# Patient Record
Sex: MALE | Race: WHITE | NOT HISPANIC OR LATINO | URBAN - NONMETROPOLITAN AREA
[De-identification: names, ages, dates, MRNs, and addresses within clinical notes are randomized per-mention and may not be internally consistent; named-entity substitution may affect disease eponyms.]

---

## 2020-06-21 ENCOUNTER — OFFICE VISIT (OUTPATIENT)
Dept: URGENT CARE | Facility: CLINIC | Age: 50
End: 2020-06-21
Payer: COMMERCIAL

## 2020-06-21 VITALS
SYSTOLIC BLOOD PRESSURE: 120 MMHG | HEIGHT: 68 IN | OXYGEN SATURATION: 95 % | TEMPERATURE: 98.3 F | HEART RATE: 75 BPM | DIASTOLIC BLOOD PRESSURE: 82 MMHG | WEIGHT: 196.4 LBS | BODY MASS INDEX: 29.77 KG/M2

## 2020-06-21 DIAGNOSIS — M25.511 ACUTE PAIN OF RIGHT SHOULDER: ICD-10-CM

## 2020-06-21 PROCEDURE — 99203 OFFICE O/P NEW LOW 30 MIN: CPT | Performed by: PHYSICIAN ASSISTANT

## 2020-06-21 RX ORDER — CYCLOBENZAPRINE HCL 5 MG
5-10 TABLET ORAL
Qty: 15 TAB | Refills: 0 | Status: SHIPPED | OUTPATIENT
Start: 2020-06-21

## 2020-06-21 RX ORDER — KETOROLAC TROMETHAMINE 30 MG/ML
60 INJECTION, SOLUTION INTRAMUSCULAR; INTRAVENOUS ONCE
Status: COMPLETED | OUTPATIENT
Start: 2020-06-21 | End: 2020-06-21

## 2020-06-21 RX ADMIN — KETOROLAC TROMETHAMINE 60 MG: 30 INJECTION, SOLUTION INTRAMUSCULAR; INTRAVENOUS at 09:46

## 2020-06-21 ASSESSMENT — ENCOUNTER SYMPTOMS
NECK PAIN: 0
CHILLS: 0
FALLS: 0
TINGLING: 1
WEAKNESS: 0
BACK PAIN: 0
FEVER: 0
SENSORY CHANGE: 0
BRUISES/BLEEDS EASILY: 0

## 2020-06-21 NOTE — PROGRESS NOTES
"Subjective:      Javier Heller is a 50 y.o. male who presents with Shoulder Pain ((R) shoulder x 3 days; radiates down to tricep; fingers tinger )            HPI  50-year-old male presents to urgent care with new problem of right shoulder pain onset 3 days ago.  Patient cannot recall specific injury, but reports pain started 3 days ago. He notes recent travel last week and recalls picking up luggage weighting about 50+ pounds, but did not notice immediate pain.   Reports mild tingling in hand, but no numbness or weakness.   Pain is constant.   Patient is left hand dominant.   Denies previous injury.   Tylenol with good relief.   Denies other associated aggravating or alleviating factors.    Review of Systems   Constitutional: Negative for chills and fever.   Musculoskeletal: Negative for back pain, falls and neck pain.        Right shoulder pain   Neurological: Positive for tingling. Negative for sensory change and weakness.   Endo/Heme/Allergies: Does not bruise/bleed easily.   All other systems reviewed and are negative.      History reviewed. No pertinent past medical history.  Medications and allergies reviewed in [facility].  Social History     Tobacco Use   • Smoking status: Never Smoker   • Smokeless tobacco: Never Used   Substance Use Topics   • Alcohol use: Yes     Comment: occ       Objective:     /82 (BP Location: Left arm, Patient Position: Sitting)   Pulse 75   Temp 36.8 °C (98.3 °F) (Temporal)   Ht 1.727 m (5' 8\")   Wt 89.1 kg (196 lb 6.4 oz)   SpO2 95%   BMI 29.86 kg/m²      Physical Exam  Vitals signs reviewed.   Constitutional:       General: He is not in acute distress.     Appearance: Normal appearance. He is well-developed.   HENT:      Head: Normocephalic and atraumatic.   Eyes:      Conjunctiva/sclera: Conjunctivae normal.   Neck:      Musculoskeletal: Normal range of motion and neck supple.   Cardiovascular:      Rate and Rhythm: Normal rate.   Pulmonary:      Effort: Pulmonary " effort is normal. No respiratory distress.   Musculoskeletal:        Arms:    Skin:     General: Skin is warm and dry.      Capillary Refill: Capillary refill takes less than 2 seconds.      Findings: No bruising.   Neurological:      General: No focal deficit present.      Mental Status: He is alert and oriented to person, place, and time.   Psychiatric:         Mood and Affect: Mood normal.         Behavior: Behavior normal.         Thought Content: Thought content normal.         Judgment: Judgment normal.                 Assessment/Plan:     1. Acute pain of right shoulder  cyclobenzaprine (FLEXERIL) 5 MG tablet    ketorolac (TORADOL) injection 60 mg    REFERRAL TO SPORTS MEDICINE     Patient given 60 mg IM injection of Toradol in clinic with good relief.  Patient advised to take Flexeril prior to bed and do not take medication while at work or operating a vehicle.  He may continue with OTC Tylenol.  Laying given for comfort.  Recommend gentle range of motion exercises.  Follow-up with sports medicine for persistent pain.  No indications for imaging at this time.   Patient verbalized understanding of treatment plan and has no further questions regarding care.

## 2020-06-26 ENCOUNTER — OFFICE VISIT (OUTPATIENT)
Dept: MEDICAL GROUP | Facility: CLINIC | Age: 50
End: 2020-06-26
Payer: COMMERCIAL

## 2020-06-26 VITALS
DIASTOLIC BLOOD PRESSURE: 82 MMHG | WEIGHT: 196.4 LBS | OXYGEN SATURATION: 95 % | TEMPERATURE: 97.7 F | HEART RATE: 100 BPM | RESPIRATION RATE: 18 BRPM | BODY MASS INDEX: 29.77 KG/M2 | SYSTOLIC BLOOD PRESSURE: 122 MMHG | HEIGHT: 68 IN

## 2020-06-26 DIAGNOSIS — M54.12 RIGHT CERVICAL RADICULOPATHY: ICD-10-CM

## 2020-06-26 PROCEDURE — 99214 OFFICE O/P EST MOD 30 MIN: CPT | Performed by: FAMILY MEDICINE

## 2020-06-26 NOTE — PROGRESS NOTES
CHIEF COMPLAINT:  Javier Heller male presenting at the request of Elenita Mejia P.A.-C.  for evaluation of Shoulder pain.   Javier Heller is complaining of right shoulder pain (left handed)  Insidious onset without specific injury approximately a week ago (late June 19, 2020)  Pain is at the deltoid region and susana-scapular   Has noticed a small lump on the RIGHT shoulder at the supraspinatus region  Quality is aching it can be sharp with certain movements  Pain is Non-radiating  Aggravated by movement, overhead activity and reaching back   NO improvement with sling immobilization provided in urgent care  Improved with  rest, but having pain at night  no prior problems with this shoulder in the past  Prior Treatments: Seen at urgent care  Prior studies: NO Prior imaging has been done   Medications tried for pain include: acetaminophen, naproxen (OTC), cyclobenzaprine at bedtime  POSITIVE night symptoms   Mechanical Symptom history: No Locking and Popping which is not necessarily any more painful than baseline  Denies any history of cervical spine pain, he did experience some mild ulnar-sided hand tingling which was transient without any notable weakness    Desk work, 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 No pertinent past medical history.    PMH:  has no past medical history on file.  MEDS:   Current Outpatient Medications:   •  cyclobenzaprine (FLEXERIL) 5 MG tablet, Take 1-2 Tabs by mouth at bedtime as needed for Moderate Pain or Muscle Spasms., Disp: 15 Tab, Rfl: 0  ALLERGIES: No Known Allergies  SURGHX: No past surgical history on file.  SOCHX:  reports that he has never smoked. He has never used smokeless tobacco. He reports current alcohol  use.  FH: Family history was reviewed, no pertinent findings to report     PHYSICAL EXAM:  There were no vitals taken for this visit.     well-developed, well-nourished in no apparent distress, alert and oriented x 3.  Gait: normal    Cervical spine:  Range of motion Slightly limited with Extension and Slightly limited with Lateral rotation  Spurling's testing is POSITIVE with pain radiating into the shoulder and susana-scapular region on the RIGHT  Cervical spine tenderness POSITIVE at the level of C5    Strength testing:     Deltoid, bilateral 5/5  Bicep, bilateral 5/5  Tricep, bilateral 5/5  Wrist Extension, bilateral 5/5  Wrist Flexion, bilateral 5/5  Finger Abduction, bilateral 5/5    Sensation:  INTACT Bilaterally        Reflexes:   Biceps: R 2+/L 2+  Triceps: R 2+/L  2+  Brachial radialis R 2+/L  2+  Oates's testing is NEGATIVE  The arms are otherwise neurovascularly intact     Shoulder Exam:    RIGHT Shoulder:  No visible swelling   Range of motion INTACT  Tenderness: Non-tender  Empty Can Testing 5/5  Internal Rotation 5/5  External Rotation 5/5  Lift Off Testing 5/5  Impingement testing Vazquez  NEGATIVE  Neer's testing NEGATIVE  Apprehension testing NEGATIVE    LEFT Shoulder:  No visible swelling   Range of motion INTACT  Tenderness: Non-tender  Empty Can Testing 5/5  Internal Rotation 5/5  External Rotation 5/5  Lift Off Testing 5/5  Impingement testing Vazquez  NEGATIVE  Neer's testing NEGATIVE  Apprehension testing NEGATIVE    Additional Findings: None    1. Right cervical radiculopathy       Javier Heller is complaining of right shoulder pain (left handed)  Insidious onset without specific injury approximately a week ago (late June 19, 2020)  No specific injury, but he had been traveling and fell asleep on the way back from [place] with his neck in an awkward position  Pain began 1 to 2 days after that  He also mentions lifting luggage during that trip, but does not recall any specific  injury    On examination he is POSITIVE Spurling's test to the RIGHT side  Otherwise, normal examination  Demonstrated strain/counterstrain technique along with cervical stretches  Provided with cervical spine program    Since his symptoms just began recently, we will not obtain x-rays unless symptoms persist    Patient has also been advised that he can contact us so we can order formal physical therapy down near his home in [place] if his symptoms persist    Follow-up as needed    X-ray imaging  not obtained    Thank you Elenita Mejia P.A.-C. for allowing me to aid in caring for your patient.

## 2020-07-03 ENCOUNTER — TELEPHONE (OUTPATIENT)
Dept: URGENT CARE | Facility: CLINIC | Age: 50
End: 2020-07-03

## 2020-07-03 NOTE — TELEPHONE ENCOUNTER
The patient called Sports Medicine 7/2 @ 1441, left a message to make an appointment with Dr. Mccain, pain is worsening. I called th patient 7/3 @ 0810am to follow up on call,b ut left a voicemail. I will try the patient again.    Yaz

## 2020-07-05 ENCOUNTER — TELEPHONE (OUTPATIENT)
Dept: URGENT CARE | Facility: CLINIC | Age: 50
End: 2020-07-05

## 2020-07-05 NOTE — TELEPHONE ENCOUNTER
The patient called Sports Medicine 73 @ 1225pm re: an appointment for Sports Medicine to bridge the patient while Dr. Mccain is out of the office. I called the patient 7/5 @ 1100am, left a message, asking the patient to call [phone] to make an appointment.

## 2020-07-10 ENCOUNTER — OFFICE VISIT (OUTPATIENT)
Dept: MEDICAL GROUP | Facility: CLINIC | Age: 50
End: 2020-07-10
Payer: COMMERCIAL

## 2020-07-10 VITALS
TEMPERATURE: 98.4 F | HEART RATE: 80 BPM | SYSTOLIC BLOOD PRESSURE: 120 MMHG | OXYGEN SATURATION: 98 % | HEIGHT: 68 IN | DIASTOLIC BLOOD PRESSURE: 80 MMHG | RESPIRATION RATE: 14 BRPM | BODY MASS INDEX: 29.77 KG/M2 | WEIGHT: 196.43 LBS

## 2020-07-10 DIAGNOSIS — M54.12 CERVICAL RADICULOPATHY: ICD-10-CM

## 2020-07-10 PROCEDURE — 99213 OFFICE O/P EST LOW 20 MIN: CPT | Performed by: FAMILY MEDICINE

## 2020-07-10 RX ORDER — PREDNISONE 20 MG/1
TABLET ORAL
Qty: 11 TAB | Refills: 0 | Status: SHIPPED | OUTPATIENT
Start: 2020-07-10

## 2020-07-10 NOTE — PROGRESS NOTES
"Subjective:     Javier Heller is a 50 y.o. male who presents for Neck Pain (The patient is seeing Dr. Mccain for neck pain that is constant and or may have a pinched nerve. ) and Shoulder Pain (Right shoulder pain.)    HPI  Pt presents for re-evaluation of neck/shoulder pain   Neck pain radiates into right upper extremity  Continues to have full function of right upper extremity, no focal weakness  No numbness or tingling  Pt initially seen by Dr. Mccain who thought majority of pain was from cervical radiculopathy   Pt was given some exercises to do at home   He has not been doing these exercises as they make the neck painful   Does not feel he is making any improvements since last visit with Dr. Mccain    Review of Systems   Constitutional: Negative for fever.   Respiratory: Negative for shortness of breath.    Cardiovascular: Negative for chest pain.   Gastrointestinal: Negative for vomiting.   Skin: Negative for rash.   Neurological: Negative for focal weakness.       PMH: No chronic medical problems   MEDS:   Current Outpatient Medications:   •  cyclobenzaprine (FLEXERIL) 5 MG tablet, Take 1-2 Tabs by mouth at bedtime as needed for Moderate Pain or Muscle Spasms., Disp: 15 Tab, Rfl: 0  ALLERGIES: No Known Allergies  SURGHX: No past surgical history on file.  SOCHX:  reports that he has never smoked. He has never used smokeless tobacco. He reports current alcohol use.  FH: Family history was reviewed, not contributing to acute neck pain     Objective:   /80 (BP Location: Left arm, Patient Position: Sitting, BP Cuff Size: Adult)   Pulse 80   Temp 36.9 °C (98.4 °F) (Temporal)   Resp 14   Ht 1.727 m (5' 8\")   Wt 89.1 kg (196 lb 6.9 oz)   SpO2 98%   BMI 29.87 kg/m²     Physical Exam  Constitutional:       General: He is not in acute distress.     Appearance: He is well-developed. He is not diaphoretic.   HENT:      Head: Normocephalic and atraumatic.   Musculoskeletal:      Comments: " Neck  General: No asymmetry, bruising, or erythema appreciated  ROM: FROM flex/extend/lateral bend/lateral rotation  Palpation: +Mild TTP along right cervical muscles and right superior trapezius  Special tests: +Spurling's on right   Neuro: Sensation intact and equal bilaterally in UE's  Vascular: Radial pulse 2+    Skin:     General: Skin is warm and dry.      Findings: No rash.   Neurological:      Mental Status: He is alert and oriented to person, place, and time.   Psychiatric:         Behavior: Behavior normal.         Thought Content: Thought content normal.         Judgment: Judgment normal.         Assessment/Plan:   Assessment    1. Cervical radiculopathy  - predniSONE (DELTASONE) 20 MG Tab; Take 2 tabs (40mg) daily x 3 days, then take 1 tab (20mg) daily x 3 days, then take 1/2 tab (10mg) daily x 4 days  Dispense: 11 Tab; Refill: 0  - REFERRAL TO PHYSICAL THERAPY Reason for Therapy: Eval/Treat/Report    Patient with cervical radiculopathy.  He is not tolerating home exercise program very well and not making any improvements.  Patient is anxious to get feeling better quickly.  Will treat with short course of prednisone and refer to physical therapy.  F/U with Dr. Mccain

## 2020-07-14 ENCOUNTER — TELEPHONE (OUTPATIENT)
Dept: MEDICAL GROUP | Facility: CLINIC | Age: 50
End: 2020-07-14

## 2020-07-14 ASSESSMENT — ENCOUNTER SYMPTOMS
VOMITING: 0
FEVER: 0
FOCAL WEAKNESS: 0
SHORTNESS OF BREATH: 0

## 2020-07-14 NOTE — TELEPHONE ENCOUNTER
Patient called today to check on the status of his referral for physical therapy. According to referrals they can not finish processing it until you finish the clinical notes. Can you please finish these and have Yaz call referrals department as soon as this is done. Then please let the patient know. His best contact number is listed in his chart.     Please and Thank you.  Regine

## 2020-07-15 NOTE — TELEPHONE ENCOUNTER
I spoke with the patient, relayed information for PT and the patient will call PT to schedule.  Yaz Cross

## 2024-07-12 ENCOUNTER — OFFICE VISIT (OUTPATIENT)
Dept: URGENT CARE | Facility: CLINIC | Age: 54
End: 2024-07-12

## 2024-07-12 VITALS
OXYGEN SATURATION: 95 % | BODY MASS INDEX: 30.92 KG/M2 | HEIGHT: 68 IN | SYSTOLIC BLOOD PRESSURE: 140 MMHG | RESPIRATION RATE: 18 BRPM | HEART RATE: 116 BPM | TEMPERATURE: 98.6 F | DIASTOLIC BLOOD PRESSURE: 90 MMHG | WEIGHT: 204 LBS

## 2024-07-12 DIAGNOSIS — K57.92 DIVERTICULITIS: ICD-10-CM

## 2024-07-12 PROCEDURE — 99214 OFFICE O/P EST MOD 30 MIN: CPT | Performed by: FAMILY MEDICINE

## 2024-07-12 PROCEDURE — 3077F SYST BP >= 140 MM HG: CPT | Performed by: FAMILY MEDICINE

## 2024-07-12 PROCEDURE — 3080F DIAST BP >= 90 MM HG: CPT | Performed by: FAMILY MEDICINE

## 2024-07-12 RX ORDER — DICYCLOMINE HYDROCHLORIDE 10 MG/1
10 CAPSULE ORAL
Qty: 120 CAPSULE | Refills: 0 | Status: SHIPPED | OUTPATIENT
Start: 2024-07-12

## 2024-07-12 RX ORDER — ACETAMINOPHEN AND CODEINE PHOSPHATE 300; 30 MG/1; MG/1
1-2 TABLET ORAL EVERY 4 HOURS PRN
Qty: 20 TABLET | Refills: 0 | Status: SHIPPED | OUTPATIENT
Start: 2024-07-12 | End: 2024-07-17

## 2024-07-12 RX ORDER — CIPROFLOXACIN 500 MG/1
500 TABLET, FILM COATED ORAL 2 TIMES DAILY
Qty: 20 TABLET | Refills: 0 | Status: SHIPPED | OUTPATIENT
Start: 2024-07-12 | End: 2024-07-22

## 2024-07-12 ASSESSMENT — ENCOUNTER SYMPTOMS
DIARRHEA: 0
BLOOD IN STOOL: 0
EYES NEGATIVE: 1
CONSTITUTIONAL NEGATIVE: 1
RESPIRATORY NEGATIVE: 1
MUSCULOSKELETAL NEGATIVE: 1
ABDOMINAL PAIN: 1
CARDIOVASCULAR NEGATIVE: 1

## 2024-07-13 ENCOUNTER — TELEPHONE (OUTPATIENT)
Dept: URGENT CARE | Facility: CLINIC | Age: 54
End: 2024-07-13

## 2024-07-17 ENCOUNTER — TELEPHONE (OUTPATIENT)
Dept: URGENT CARE | Facility: CLINIC | Age: 54
End: 2024-07-17

## 2024-07-17 RX ORDER — ACETAMINOPHEN AND CODEINE PHOSPHATE 300; 30 MG/1; MG/1
1-2 TABLET ORAL EVERY 4 HOURS PRN
Qty: 28 TABLET | Refills: 0 | Status: SHIPPED | OUTPATIENT
Start: 2024-07-17 | End: 2024-07-18

## 2024-07-18 ENCOUNTER — TELEPHONE (OUTPATIENT)
Dept: URGENT CARE | Facility: CLINIC | Age: 54
End: 2024-07-18

## 2024-07-18 RX ORDER — ACETAMINOPHEN AND CODEINE PHOSPHATE 300; 30 MG/1; MG/1
1-2 TABLET ORAL EVERY 4 HOURS PRN
Qty: 11 TABLET | Refills: 0 | Status: SHIPPED | OUTPATIENT
Start: 2024-07-18 | End: 2024-07-23

## 2025-06-05 ENCOUNTER — OFFICE VISIT (OUTPATIENT)
Dept: URGENT CARE | Facility: CLINIC | Age: 55
End: 2025-06-05
Payer: COMMERCIAL

## 2025-06-05 ENCOUNTER — APPOINTMENT (OUTPATIENT)
Dept: RADIOLOGY | Facility: IMAGING CENTER | Age: 55
End: 2025-06-05
Payer: COMMERCIAL

## 2025-06-05 VITALS
BODY MASS INDEX: 30.92 KG/M2 | RESPIRATION RATE: 16 BRPM | OXYGEN SATURATION: 96 % | TEMPERATURE: 98.2 F | SYSTOLIC BLOOD PRESSURE: 134 MMHG | WEIGHT: 204 LBS | DIASTOLIC BLOOD PRESSURE: 72 MMHG | HEIGHT: 68 IN | HEART RATE: 78 BPM

## 2025-06-05 DIAGNOSIS — K59.00 CONSTIPATION, UNSPECIFIED CONSTIPATION TYPE: ICD-10-CM

## 2025-06-05 DIAGNOSIS — R11.0 NAUSEA: ICD-10-CM

## 2025-06-05 DIAGNOSIS — F10.10 ETOH ABUSE: ICD-10-CM

## 2025-06-05 DIAGNOSIS — R10.32 LEFT LOWER QUADRANT ABDOMINAL PAIN: Primary | ICD-10-CM

## 2025-06-05 LAB
APPEARANCE UR: CLEAR
BILIRUB UR STRIP-MCNC: NEGATIVE MG/DL
COLOR UR AUTO: YELLOW
GLUCOSE UR STRIP.AUTO-MCNC: NEGATIVE MG/DL
KETONES UR STRIP.AUTO-MCNC: NEGATIVE MG/DL
LEUKOCYTE ESTERASE UR QL STRIP.AUTO: NEGATIVE
NITRITE UR QL STRIP.AUTO: NEGATIVE
PH UR STRIP.AUTO: 7 [PH] (ref 5–8)
PROT UR QL STRIP: NEGATIVE MG/DL
RBC UR QL AUTO: NORMAL
SP GR UR STRIP.AUTO: 1.01
UROBILINOGEN UR STRIP-MCNC: 0.2 MG/DL

## 2025-06-05 PROCEDURE — 3075F SYST BP GE 130 - 139MM HG: CPT

## 2025-06-05 PROCEDURE — 3078F DIAST BP <80 MM HG: CPT

## 2025-06-05 PROCEDURE — 74019 RADEX ABDOMEN 2 VIEWS: CPT | Mod: TC

## 2025-06-05 PROCEDURE — 81002 URINALYSIS NONAUTO W/O SCOPE: CPT

## 2025-06-05 PROCEDURE — 99214 OFFICE O/P EST MOD 30 MIN: CPT

## 2025-06-05 RX ORDER — DOCUSATE SODIUM 100 MG/1
100 CAPSULE, LIQUID FILLED ORAL 2 TIMES DAILY
Qty: 60 CAPSULE | Refills: 0 | Status: SHIPPED | OUTPATIENT
Start: 2025-06-05

## 2025-06-05 RX ORDER — ONDANSETRON 4 MG/1
4 TABLET, FILM COATED ORAL EVERY 4 HOURS PRN
Qty: 20 TABLET | Refills: 0 | Status: SHIPPED | OUTPATIENT
Start: 2025-06-05 | End: 2025-06-20

## 2025-06-05 RX ORDER — SULFAMETHOXAZOLE AND TRIMETHOPRIM 400; 80 MG/1; MG/1
2 TABLET ORAL 2 TIMES DAILY
COMMUNITY

## 2025-06-05 ASSESSMENT — ENCOUNTER SYMPTOMS
CONSTIPATION: 1
ABDOMINAL PAIN: 1
BLOOD IN STOOL: 0
WEIGHT LOSS: 0
CHILLS: 0
DIARRHEA: 0
FEVER: 0
BACK PAIN: 0
DIZZINESS: 0
FLANK PAIN: 0
VOMITING: 0
HEARTBURN: 0
NAUSEA: 1

## 2025-06-05 NOTE — PROGRESS NOTES
"  Subjective:   CHIEF COMPLAINT  Chief Complaint   Patient presents with    Other     diverticulitis flare up x 1 week patient requesting doctors note for work     Abdominal Pain     X 1 week          Javier Heller is a very pleasant 55 y.o. male who presents for Other (diverticulitis flare up x 1 week patient requesting doctors note for work ) and Abdominal Pain (X 1 week )      Patient presents with complaints of abdominal diffuse, intermittent nausea, and worries of potential \"diverticulosis flare.\"  Patient states symptoms started about a week and a half ago, he has been trying to treat with fluids and modifying his diet that has been ineffective.  He did have a flare last July when she had leftover medications from started taking them.  He states medications were Bentyl and ciprofloxacin.  States that symptoms have improved though pain is still there.  Of note patient does state that he has been drinking in excess, he cannot identify exactly how long this has been going on though he states he has been trying to quit since Memorial Day, though he was having increased stress due to the the symptoms did drink last night.  States he drinks about 3 shots of vodka per night.  Currently denies any fever chills or bodyaches    Other  Associated symptoms include abdominal pain and nausea. Pertinent negatives include no chest pain, chills, fever or vomiting.   Abdominal Pain  Associated symptoms include constipation, dysuria, frequency and nausea. Pertinent negatives include no diarrhea, fever, hematuria, melena, vomiting or weight loss.       Review of Systems   Constitutional:  Negative for chills, fever and weight loss.   Cardiovascular:  Negative for chest pain.   Gastrointestinal:  Positive for abdominal pain, constipation and nausea. Negative for blood in stool, diarrhea, heartburn, melena and vomiting.   Genitourinary:  Positive for dysuria, frequency and urgency. Negative for flank pain and hematuria. " "  Musculoskeletal:  Negative for back pain.   Neurological:  Negative for dizziness.   All other systems reviewed and are negative.    Refer to HPI for additional details.    During this visit, appropriate PPE was worn, and hand hygiene was performed.    PMH:  has no past medical history on file.    MEDS: Current Medications[1]    ALLERGIES: Allergies[2]  SURGHX: Past Surgical History[3]  SOCHX:  reports that he has never smoked. He has never used smokeless tobacco. He reports current alcohol use.    FH: Per HPI as applicable/pertinent.    Medications, Allergies, and current problem list reviewed today in Epic.     Objective:     /72   Pulse 78   Temp 36.8 °C (98.2 °F)   Resp 16   Ht 1.727 m (5' 8\")   Wt 92.5 kg (204 lb)   SpO2 96%     Physical Exam  Constitutional:       General: He is not in acute distress.     Appearance: Normal appearance. He is not ill-appearing, toxic-appearing or diaphoretic.   HENT:      Head: Normocephalic and atraumatic.      Mouth/Throat:      Mouth: Mucous membranes are moist.      Pharynx: Oropharynx is clear.   Cardiovascular:      Rate and Rhythm: Normal rate.   Pulmonary:      Effort: Pulmonary effort is normal. No respiratory distress.   Abdominal:      General: Abdomen is flat. There is no distension.      Palpations: There is no mass.      Tenderness: There is abdominal tenderness in the right lower quadrant, periumbilical area and left lower quadrant. There is no right CVA tenderness, left CVA tenderness, guarding or rebound. Negative signs include psoas sign.      Hernia: No hernia is present.       Neurological:      Mental Status: He is alert.         Assessment/Plan:     Diagnosis and associated orders:     1. Left lower quadrant abdominal pain  - POCT Urinalysis  - [id number] VIEWS    2. Nausea  - [id number] VIEWS  - ondansetron (ZOFRAN) 4 MG Tab tablet; Take 1 Tablet by mouth every four hours as needed for Nausea/Vomiting for up to 15 days.  Dispense: 20 " Tablet; Refill: 0    3. Constipation, unspecified constipation type  - docusate sodium (COLACE) 100 MG Cap; Take 1 Capsule by mouth 2 times a day.  Dispense: 60 Capsule; Refill: 0    4. ETOH abuse    Other orders  - sulfamethoxazole-trimethoprim (BACTRIM) 400-80 MG Tab; Take 2 Tablets by mouth 2 times a day. (Patient not taking: Reported on 6/5/2025)     Comments/MDM:     Patient history and physical exam consistent with acute left lower quadrant pain with concomitant constipation and nausea.  No red flag symptoms or acute distress noted  I discussed HPI and physical exam with patient.  Physical exam vital signs overall reassuring.  Patient has been self treating with ciprofloxacin any infection likely being treated at this point.  I did explain to him that he does have some high risk factors specially with his excessive drinking that can lead to diverticulitis flares as well as other GI complications.  Given these had some constipation I did recommend doing in clinic abdominal x-ray to rule out ileus.  Will also do UA to rule out any urological infection  In clinic UA unremarkable  Abdominal x-ray showed small amount of gas and some slight constipation no ileus was found  Outpatient management will consist of low fiber diet presently until symptoms begin to improve followed by advancing as tolerated eventually high-fiber, increase hydration, monitor symptoms  Reduction and ultimately cessation of EtOH use, advised patient to reach out to his PCP or going to ER for detoxing if need be.  ER and RTC precautions discussed  Follow up in 3-5 days if no improvement in symptoms           Differential diagnosis, natural history, supportive care, and indications for immediate follow-up discussed.    Advised the patient to follow-up with the primary care physician for recheck, reevaluation, and consideration of further management.    Please note that this dictation was created using voice recognition software. I have made a  reasonable attempt to correct obvious errors, but I expect that there are errors of grammar and possibly content that I did not discover before finalizing the note.    This note was electronically signed by MAXWELL Cade.         [1]   Current Outpatient Medications:     ondansetron (ZOFRAN) 4 MG Tab tablet, Take 1 Tablet by mouth every four hours as needed for Nausea/Vomiting for up to 15 days., Disp: 20 Tablet, Rfl: 0    docusate sodium (COLACE) 100 MG Cap, Take 1 Capsule by mouth 2 times a day., Disp: 60 Capsule, Rfl: 0    dicyclomine (BENTYL) 10 MG Cap, Take 1 Capsule by mouth 4 Times a Day,Before Meals and at Bedtime., Disp: 120 Capsule, Rfl: 0    cyclobenzaprine (FLEXERIL) 5 MG tablet, Take 1-2 Tabs by mouth at bedtime as needed for Moderate Pain or Muscle Spasms., Disp: 15 Tab, Rfl: 0    sulfamethoxazole-trimethoprim (BACTRIM) 400-80 MG Tab, Take 2 Tablets by mouth 2 times a day. (Patient not taking: Reported on 6/5/2025), Disp: , Rfl:     predniSONE (DELTASONE) 20 MG Tab, Take 2 tabs (40mg) daily x 3 days, then take 1 tab (20mg) daily x 3 days, then take 1/2 tab (10mg) daily x 4 days (Patient not taking: Reported on 6/5/2025), Disp: 11 Tab, Rfl: 0  [2] No Known Allergies  [3] History reviewed. No pertinent surgical history.